# Patient Record
Sex: MALE | URBAN - METROPOLITAN AREA
[De-identification: names, ages, dates, MRNs, and addresses within clinical notes are randomized per-mention and may not be internally consistent; named-entity substitution may affect disease eponyms.]

---

## 2019-07-02 ENCOUNTER — TRANSFERRED RECORDS (OUTPATIENT)
Dept: HEALTH INFORMATION MANAGEMENT | Facility: CLINIC | Age: 79
End: 2019-07-02

## 2019-07-03 ENCOUNTER — TRANSFERRED RECORDS (OUTPATIENT)
Dept: HEALTH INFORMATION MANAGEMENT | Facility: CLINIC | Age: 79
End: 2019-07-03

## 2019-07-05 ENCOUNTER — TRANSFERRED RECORDS (OUTPATIENT)
Dept: HEALTH INFORMATION MANAGEMENT | Facility: CLINIC | Age: 79
End: 2019-07-05

## 2019-07-15 ENCOUNTER — TRANSFERRED RECORDS (OUTPATIENT)
Dept: HEALTH INFORMATION MANAGEMENT | Facility: CLINIC | Age: 79
End: 2019-07-15

## 2019-07-22 ENCOUNTER — TRANSFERRED RECORDS (OUTPATIENT)
Dept: HEALTH INFORMATION MANAGEMENT | Facility: CLINIC | Age: 79
End: 2019-07-22

## 2019-08-07 ENCOUNTER — TRANSFERRED RECORDS (OUTPATIENT)
Dept: HEALTH INFORMATION MANAGEMENT | Facility: CLINIC | Age: 79
End: 2019-08-07

## 2019-08-12 ENCOUNTER — TRANSFERRED RECORDS (OUTPATIENT)
Dept: HEALTH INFORMATION MANAGEMENT | Facility: CLINIC | Age: 79
End: 2019-08-12

## 2019-08-21 ENCOUNTER — TRANSFERRED RECORDS (OUTPATIENT)
Dept: HEALTH INFORMATION MANAGEMENT | Facility: CLINIC | Age: 79
End: 2019-08-21

## 2019-08-26 NOTE — TELEPHONE ENCOUNTER
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 No    Has patient been seen for any external appt for this diagnosis? Per Ronaldo, all records, imaging and Bx at [facility] and additional Imaging @ [facility] [phone]    ADDITIONAL INFORMATION:  NA

## 2019-08-27 NOTE — TELEPHONE ENCOUNTER
RECORDS STATUS - ALL OTHER DIAGNOSIS      RECORDS RECEIVED FROM: [facility]    DATE RECEIVED: 09/03/2019   NOTES STATUS DETAILS   OFFICE NOTE from referring provider YES CE   OFFICE NOTE from medical oncologist YES CE   DISCHARGE SUMMARY from hospital NA    DISCHARGE REPORT from the ER NA    OPERATIVE REPORT NA    MEDICATION LIST YES CE   CLINICAL TRIAL TREATMENTS TO DATE NA    LABS YES CE   PATHOLOGY REPORTS PENDING REQUEST SENT TO [place]   08/27/2019   ANYTHING RELATED TO DIAGNOSIS NA    GENONOMIC TESTING NA    TYPE:     IMAGING (NEED IMAGES & REPORT) PENDING REQUEST SENT TO [facility]  08/27/2019   CT SCANS     MRI     MAMMO     ULTRASOUND     PET

## 2019-09-03 ENCOUNTER — ONCOLOGY VISIT (OUTPATIENT)
Dept: ONCOLOGY | Facility: CLINIC | Age: 79
End: 2019-09-03
Attending: INTERNAL MEDICINE
Payer: COMMERCIAL

## 2019-09-03 ENCOUNTER — PRE VISIT (OUTPATIENT)
Dept: ONCOLOGY | Facility: CLINIC | Age: 79
End: 2019-09-03

## 2019-09-03 VITALS
RESPIRATION RATE: 16 BRPM | SYSTOLIC BLOOD PRESSURE: 145 MMHG | DIASTOLIC BLOOD PRESSURE: 76 MMHG | TEMPERATURE: 98.4 F | OXYGEN SATURATION: 95 % | HEART RATE: 88 BPM | WEIGHT: 253.3 LBS

## 2019-09-03 DIAGNOSIS — R22.31 MASS OF RIGHT UPPER EXTREMITY: ICD-10-CM

## 2019-09-03 DIAGNOSIS — K21.9 GASTROESOPHAGEAL REFLUX DISEASE WITHOUT ESOPHAGITIS: ICD-10-CM

## 2019-09-03 DIAGNOSIS — C49.9 SARCOMA (H): ICD-10-CM

## 2019-09-03 DIAGNOSIS — E66.813 CLASS 3 SEVERE OBESITY DUE TO EXCESS CALORIES WITHOUT SERIOUS COMORBIDITY WITH BODY MASS INDEX (BMI) OF 40.0 TO 44.9 IN ADULT (H): ICD-10-CM

## 2019-09-03 DIAGNOSIS — C78.00 MALIGNANT NEOPLASM METASTATIC TO LUNG, UNSPECIFIED LATERALITY (H): Primary | ICD-10-CM

## 2019-09-03 DIAGNOSIS — E66.01 CLASS 3 SEVERE OBESITY DUE TO EXCESS CALORIES WITHOUT SERIOUS COMORBIDITY WITH BODY MASS INDEX (BMI) OF 40.0 TO 44.9 IN ADULT (H): ICD-10-CM

## 2019-09-03 DIAGNOSIS — C61 MALIGNANT NEOPLASM OF PROSTATE (H): ICD-10-CM

## 2019-09-03 DIAGNOSIS — Z85.828 HISTORY OF SKIN CANCER: ICD-10-CM

## 2019-09-03 DIAGNOSIS — I82.621 DEEP VEIN THROMBOSIS (DVT) OF RIGHT UPPER EXTREMITY, UNSPECIFIED CHRONICITY, UNSPECIFIED VEIN (H): ICD-10-CM

## 2019-09-03 DIAGNOSIS — N20.1 URETERAL STONE: ICD-10-CM

## 2019-09-03 PROBLEM — M19.012 PRIMARY OSTEOARTHRITIS OF BOTH SHOULDERS: Status: ACTIVE | Noted: 2019-09-03

## 2019-09-03 PROBLEM — I80.8 SUPERFICIAL THROMBOPHLEBITIS OF RIGHT UPPER EXTREMITY: Status: ACTIVE | Noted: 2019-05-21

## 2019-09-03 PROBLEM — Z87.448 H/O URETHRAL STRICTURE: Status: ACTIVE | Noted: 2019-07-02

## 2019-09-03 PROBLEM — R73.9 HYPERGLYCEMIA: Status: ACTIVE | Noted: 2019-09-03

## 2019-09-03 PROBLEM — I82.629 DEEP VEIN THROMBOSIS (DVT) OF UPPER EXTREMITY (H): Status: ACTIVE | Noted: 2019-07-03

## 2019-09-03 PROBLEM — R22.30 MASS OF UPPER EXTREMITY: Status: ACTIVE | Noted: 2019-07-05

## 2019-09-03 PROBLEM — I82.A21: Status: ACTIVE | Noted: 2019-05-21

## 2019-09-03 PROBLEM — H25.11 AGE-RELATED NUCLEAR CATARACT OF RIGHT EYE: Status: ACTIVE | Noted: 2018-10-05

## 2019-09-03 PROBLEM — E78.00 PURE HYPERCHOLESTEROLEMIA: Status: ACTIVE | Noted: 2019-09-03

## 2019-09-03 PROBLEM — M48.07 SPINAL STENOSIS OF LUMBOSACRAL REGION: Status: ACTIVE | Noted: 2019-09-03

## 2019-09-03 PROBLEM — M19.011 PRIMARY OSTEOARTHRITIS OF BOTH SHOULDERS: Status: ACTIVE | Noted: 2019-09-03

## 2019-09-03 PROBLEM — R91.8 LUNG MASS: Status: ACTIVE | Noted: 2019-07-03

## 2019-09-03 PROBLEM — N13.2 HYDRONEPHROSIS WITH URINARY OBSTRUCTION DUE TO URETERAL CALCULUS: Status: ACTIVE | Noted: 2019-05-29

## 2019-09-03 PROBLEM — H25.12 AGE-RELATED NUCLEAR CATARACT OF LEFT EYE: Status: ACTIVE | Noted: 2018-10-05

## 2019-09-03 PROCEDURE — G0463 HOSPITAL OUTPT CLINIC VISIT: HCPCS | Mod: ZF

## 2019-09-03 PROCEDURE — 99205 OFFICE O/P NEW HI 60 MIN: CPT | Mod: ZP | Performed by: INTERNAL MEDICINE

## 2019-09-03 RX ORDER — GABAPENTIN 300 MG/1
CAPSULE ORAL
COMMUNITY
Start: 2019-04-08 | End: 2020-08-20

## 2019-09-03 RX ORDER — SENNOSIDES 8.6 MG
650 CAPSULE ORAL
COMMUNITY

## 2019-09-03 RX ORDER — AMOXICILLIN 500 MG/1
CAPSULE ORAL
COMMUNITY
Start: 2018-11-26

## 2019-09-03 RX ORDER — ASPIRIN 325 MG/1
325 TABLET, FILM COATED ORAL
COMMUNITY

## 2019-09-03 RX ORDER — ACETAMINOPHEN 500 MG
1000 TABLET ORAL
COMMUNITY

## 2019-09-03 SDOH — HEALTH STABILITY: MENTAL HEALTH: HOW OFTEN DO YOU HAVE A DRINK CONTAINING ALCOHOL?: NEVER

## 2019-09-03 ASSESSMENT — PAIN SCALES - GENERAL: PAINLEVEL: MILD PAIN (3)

## 2019-09-03 NOTE — LETTER
RE: Netta Barney  [address] [facility] [address]     Dear Colleague,    Thank you for referring your patient, Netta Barney, to the [facility]. Please see a copy of my visit note below.    9-3-19    I saw Mr. Barney for a second opinion regarding a sarcoma of the right arm.  In brief he noted a lump in his right arm about a year ago.  At the time it was felt to be a benign mass, but over the last year this grew leading to an ultrasound which revealed a DVT and he was placed on Xarelto.  In May he developed symptoms of a kidney stone and had a CT scan noted a right lung nodule.  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  He does have a history of basal cell carcinoma and has been seeing a dermatologist about every 6 months last a bit more than 6 months ago.  He also does have a history of prostate cancer in 1990 treated with radioactive seeds.  Overall other than this arm mass he has been doing pretty well.  He is on Lovenox twice a day for the DVT and is fairly active including driving buses.  He does note possibly more leg weakness more recently though generally but walks a lot in stores and goes up stairs OK and does all the housework caring for his disabled wire.  He is on gabapentin initially for back pain but this is been increased for his right arm pain which he describes as sometimes prickly.  This sometimes bothers him getting to sleep but he discovered that if he puts his arm in cold water for a few minutes this markedly improves the pain and allows him to sleep.  He has seen the palliative care team at [place] and they are adjusting his arm medications.    His past history is notable for GERD, cataract extraction, knee arthroplasty, prostate cancer, and past Mohs surgery.    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    He is a never smoker and does not abuse alcohol or other drugs.      On exam he appeared comfortable with normal affect.  BP (!) 145/76   Pulse 88   Temp 98.4  F (36.9  C) (Oral)   Resp 16   Wt 114.9 kg (253 lb 4.8 oz)   SpO2 95%   There is no icterus, pupils are round, oropharynx unremarkable.  There is no thyromegaly or neck mass.  There is no lymphadenopathy except for hard fixed adenopathy in the right axilla.  The chest is clear, there is RRR with a grade 3 ALIX at LSB.  There is an obese abdomen with no HSMT.  There is 2+ pitting edema of both legs pretibially.  Romberg and heel and heel walking are okay.  The right arm is swollen and somewhat tender with a hard mass-effect in the proximal upper arm.      I reviewed his pathology reports from [place] of both the arm mass and the lung biopsy.  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  The final opinion from Dr. 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  However the clinical picture seems best suited to a nerve sheath tumor.      I reviewed his outside imaging from [facility] including the PET scan and other CTs; he has a right lung nodule which has grown mildly compared with 2 July to August 12.  He also has the right arm mass.    We discussed the nature of cancer in general and his sarcoma in particular.  We went over the limitations of scans and probable life expectancy with someone with metastatic sarcoma, pointing out that these are curves of large groups of people and different people fall on the line in different places.  We discussed treatment options and he would like to not have too many side effects but is interested in proceeding to try to get some benefit if it seems reasonable to him.  We discussed Doxil, Gemzar, and Keytruda.  I suggested Doxil as the first option and went over the typical toxicities of that.   After quite a bit of discussion he decided he would like to go that route and he will be seeing Dr. Hernandez at [place] to start that.  I did suggest getting another chest CT at the time just to get a better picture of growth rate.  Based on the growth rate at the time of next imaging could be determined.    All other questions were addressed and they will call if others arise.  t>60 min most face to face C&C    Long Conroy M.D.  Professor  Hematology, Oncology and Transplantation

## 2019-09-03 NOTE — NURSING NOTE
Oncology Rooming Note    September 3, 2019 10:53 AM   Netta Barney is a 79 year old male who presents for:    Chief Complaint   Patient presents with     Oncology Clinic Visit     NEW; Arm Sarcoma Metastatic lung     Initial Vitals: BP (!) 145/76   Pulse 88   Temp 98.4  F (36.9  C) (Oral)   Resp 16   Wt 114.9 kg (253 lb 4.8 oz)   SpO2 95%  There is no height or weight on file to calculate BMI. There is no height or weight on file to calculate BSA.  Mild Pain (3) Comment: arm\   No LMP for male patient.  Allergies reviewed: Yes  Medications reviewed: Yes    Medications: Medication refills not needed today.  Pharmacy name entered into EPIC[place] [address] [facility] [address]    Clinical concerns: No new concerns.       Kayleigh Huynh, [facility]

## 2019-09-03 NOTE — TELEPHONE ENCOUNTER
September 3, 2019  2:23 PM  Slides received from [facility] and sent to pathology (Case# [id number]   and [id number])

## 2019-09-03 NOTE — PROGRESS NOTES
9-3-19    I saw Mr. Barney for a second opinion regarding a sarcoma of the right arm.  In brief he noted a lump in his right arm about a year ago.  At the time it was felt to be a benign mass, but over the last year this grew leading to an ultrasound which revealed a DVT and he was placed on Xarelto.  In May he developed symptoms of a kidney stone and had a CT scan noted a right lung nodule.  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  He does have a history of basal cell carcinoma and has been seeing a dermatologist about every 6 months last a bit more than 6 months ago.  He also does have a history of prostate cancer in 1990 treated with radioactive seeds.  Overall other than this arm mass he has been doing pretty well.  He is on Lovenox twice a day for the DVT and is fairly active including driving buses.  He does note possibly more leg weakness more recently though generally but walks a lot in stores and goes up stairs OK and does all the housework caring for his disabled wire.  He is on gabapentin initially for back pain but this is been increased for his right arm pain which he describes as sometimes prickly.  This sometimes bothers him getting to sleep but he discovered that if he puts his arm in cold water for a few minutes this markedly improves the pain and allows him to sleep.  He has seen the palliative care team at [place] and they are adjusting his arm medications.    His past history is notable for GERD, cataract extraction, knee arthroplasty, prostate cancer, and past Mohs surgery.    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    He is a never smoker and does not abuse alcohol or other drugs.      On exam he appeared comfortable with normal affect.  BP (!) 145/76   Pulse 88   Temp 98.4  F (36.9  C)  (Oral)   Resp 16   Wt 114.9 kg (253 lb 4.8 oz)   SpO2 95%   There is no icterus, pupils are round, oropharynx unremarkable.  There is no thyromegaly or neck mass.  There is no lymphadenopathy except for hard fixed adenopathy in the right axilla.  The chest is clear, there is RRR with a grade 3 ALIX at LSB.  There is an obese abdomen with no HSMT.  There is 2+ pitting edema of both legs pretibially.  Romberg and heel and heel walking are okay.  The right arm is swollen and somewhat tender with a hard mass-effect in the proximal upper arm.      I reviewed his pathology reports from [place] of both the arm mass and the lung biopsy.  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  The final opinion from Dr. 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  However the clinical picture seems best suited to a nerve sheath tumor.      I reviewed his outside imaging from [facility] including the PET scan and other CTs; he has a right lung nodule which has grown mildly compared with 2 July to August 12.  He also has the right arm mass.    We discussed the nature of cancer in general and his sarcoma in particular.  We went over the limitations of scans and probable life expectancy with someone with metastatic sarcoma, pointing out that these are curves of large groups of people and different people fall on the line in different places.  We discussed treatment options and he would like to not have too many side effects but is interested in proceeding to try to get some benefit if it seems reasonable to him.  We discussed Doxil, Gemzar, and Keytruda.  I suggested Doxil as the first option and went over the typical toxicities of that.  After quite a bit of discussion he decided he would like to go that route and he will be seeing Dr. Hernandez at [place] to start that.  I did suggest getting another chest CT at the time just to get a better picture of growth rate.   Based on the growth rate at the time of next imaging could be determined.    All other questions were addressed and they will call if others arise.  t>60 min most face to face C&C    Long Conroy M.D.  Professor  Hematology, Oncology and Transplantation

## 2019-09-05 LAB — COPATH REPORT: NORMAL

## 2019-09-05 PROCEDURE — 00000346 ZZHCL STATISTIC REVIEW OUTSIDE SLIDES TC 88321: Performed by: INTERNAL MEDICINE

## 2019-09-13 ENCOUNTER — TELEPHONE (OUTPATIENT)
Dept: ONCOLOGY | Facility: CLINIC | Age: 79
End: 2019-09-13

## 2019-09-16 ENCOUNTER — APPOINTMENT (OUTPATIENT)
Dept: LAB | Facility: CLINIC | Age: 79
End: 2019-09-16
Attending: INTERNAL MEDICINE
Payer: MEDICARE